# Patient Record
Sex: MALE | Race: WHITE
[De-identification: names, ages, dates, MRNs, and addresses within clinical notes are randomized per-mention and may not be internally consistent; named-entity substitution may affect disease eponyms.]

---
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
## 2020-03-17 ENCOUNTER — HOSPITAL ENCOUNTER (EMERGENCY)
Dept: HOSPITAL 25 - UCCORT | Age: 20
Discharge: HOME | End: 2020-03-17
Payer: COMMERCIAL

## 2020-03-17 VITALS — DIASTOLIC BLOOD PRESSURE: 67 MMHG | SYSTOLIC BLOOD PRESSURE: 135 MMHG

## 2020-03-17 DIAGNOSIS — L03.032: Primary | ICD-10-CM

## 2020-03-17 PROCEDURE — G0463 HOSPITAL OUTPT CLINIC VISIT: HCPCS

## 2020-03-17 PROCEDURE — 99212 OFFICE O/P EST SF 10 MIN: CPT

## 2020-03-17 NOTE — UC
Lower Extremity/Ankle HPI





- HPI Summary


HPI Summary: 


19-year-old male presents with 4 day history of tenderness, redness, and 

swelling to the dorsal left great toe at the base of the toenail.  No known 

injury.  Denies fever, chills, drainage, or joint pain.








- History of Current Complaint


Chief Complaint: UCLowerExtremity


Stated Complaint: INFECTED LT BIG TOE


Time Seen by Provider: 03/17/20 17:41


Hx Obtained From: Patient


Pain Intensity: 5





- Allergies/Home Medications


Allergies/Adverse Reactions: 


 Allergies











Allergy/AdvReac Type Severity Reaction Status Date / Time


 


No Known Allergies Allergy   Verified 03/17/20 17:35











Home Medications: 


 Home Medications





Ibuprofen 200 mg PO Q6HR PRN 03/17/20 [History Confirmed 03/17/20]


cephALEXin [Keflex] 500 mg PO QID 7 Days #28 capsule 03/17/20 [Rx]











PMH/Surg Hx/FS Hx/Imm Hx


Previously Healthy: Yes - Denies significant PMH





- Surgical History


Surgical History: Yes


Surgery Procedure, Year, and Place: T&A





- Family History


Known Family History: 


   Negative: Diabetes





- Social History


Lives: With Family


Alcohol Use: None


Substance Use Type: None


Smoking Status (MU): Never Smoked Tobacco





- Immunization History


Most Recent Influenza Vaccination: NOT YET 2017


Vaccination Up to Date: Yes





Review of Systems


All Other Systems Reviewed And Are Negative: Yes


Constitutional: Negative: Fever, Chills


Skin: Positive: Other - See HPI


Respiratory: Positive: Negative


Cardiovascular: Positive: Negative


Gastrointestinal: Positive: Negative


Genitourinary: Positive: Negative


Neurovascular: Negative: Decreased Sensation


Musculoskeletal: Negative: Arthralgia, Decreased ROM


Neurological/Mental Status: Positive: Negative





Physical Exam





- Summary


Physical Exam Summary: 


GENERAL APPEARANCE: Well developed, well nourished, alert and cooperative, and 

appears to be in no acute distress.





CARDIAC: Normal S1 and S2. No S3, S4 or murmurs. Rhythm is regular. There is no 

peripheral edema, cyanosis or pallor. Extremities are warm and well perfused. 

Capillary refill is less than 2 seconds. Peripheral pulses intact.





LUNGS: Clear to auscultation without rales, rhonchi, wheezing or diminished 

breath sounds.





ABDOMEN: Positive bowel sounds. Soft, nondistended, nontender. No guarding or 

rebound. No masses or hepatosplenomegally.





MUSKULOSKELETAL: ROM intact to all extremities. No joint erythema or 

tenderness. Normal muscular development. Normal gait.





EXTREMITIES: Erythema and mild edema to the proximal nail fold of the left 

great toe without induration, fluctuance, or drainage.





SKIN: Skin normal color, texture and turgor.





Triage Information Reviewed: Yes


Vital Signs: 


 Initial Vital Signs











Temp  98 F   03/17/20 17:30


 


Pulse  77   03/17/20 17:30


 


Resp  14   03/17/20 17:30


 


BP  135/67   03/17/20 17:30


 


Pulse Ox  100   03/17/20 17:30











Vital Signs Reviewed: Yes





Lower Extremity Course/Dx





- Course


Course Of Treatment: 


19-year-old male presents with 4 day history of tenderness, redness, and 

swelling to the dorsal left great toe at the base of the toenail.  No known 

injury.  Denies fever, chills, drainage, or joint pain.  Afebrile.  Vital signs 

stable.  Patient had erythema and mild edema to the proximal nail fold of the 

left great toe without induration, fluctuance, or drainage and otherwise 

unremarkable exam.  We'll start him on cephalexin 500 mg 4 times a day 7 days 

to treat for a paronychia.  He is to follow-up with podiatry in 3-5 days if 

symptoms are not improving.  Anticipatory guidance and warning symptoms are 

patient.  Verbalizes understanding and agrees with plan of care.








- Differential Dx/Diagnosis


Differential Diagnosis/HQI/PQRI: Foreign Body, Gout, Infection, Other - Tinea


Provider Diagnosis: 


 Paronychia








Discharge ED





- Sign-Out/Discharge


Documenting (check all that apply): Patient Departure


All imaging exams completed and their final reports reviewed: No Studies





- Discharge Plan


Condition: Stable


Disposition: HOME


Prescriptions: 


cephALEXin [Keflex] 500 mg PO QID 7 Days #28 capsule


Patient Education Materials:  Paronychia (ED)


Referrals: 


Tayo Hyde DO [Primary Care Provider] - 


Cuong HAWKINS,Vernon LEGER [Doctor of Podiatric Medicine] - 3 Days (Follow up in 3-5 

days if no improvement in symptoms. Call for appointment.)


Additional Instructions: 


You appear to have an infection of the toe called a paronychia.  We will start 

you on an antibiotic to treat the infection.





Take cephalexin 500 mg one capsule 4 times a day for 7 days.  Be sure to take 

the entire course even if feeling better.





Soak the foot in a warm water and Epsom salt solution for 15-20 minutes 3-4 

times a day.





Follow-up with podiatry in 3-5 days if symptoms are not improving.  Call for an 

appointment.





Immediate medical attention if you develop fever greater than 100.5 F, severe 

pain not managed with pain medication, redness that rapidly spreads, increased 

swelling of the toe, or red streaking up the leg. Seek immediate medical 

attention should any of these occur.





- Billing Disposition and Condition


Condition: STABLE


Disposition: Home